# Patient Record
Sex: MALE | Race: BLACK OR AFRICAN AMERICAN | NOT HISPANIC OR LATINO | Employment: OTHER | URBAN - METROPOLITAN AREA
[De-identification: names, ages, dates, MRNs, and addresses within clinical notes are randomized per-mention and may not be internally consistent; named-entity substitution may affect disease eponyms.]

---

## 2017-02-16 ENCOUNTER — TELEPHONE (OUTPATIENT)
Dept: TRANSPLANT | Facility: CLINIC | Age: 67
End: 2017-02-16

## 2017-02-24 ENCOUNTER — TELEPHONE (OUTPATIENT)
Dept: TRANSPLANT | Facility: CLINIC | Age: 67
End: 2017-02-24

## 2017-02-24 NOTE — TELEPHONE ENCOUNTER
----- Message from Shadi Burgos sent at 2/24/2017  8:13 AM CST -----  Contact: pt  Fax # [phone] Dr Lobito Moss for labs

## 2017-03-21 ENCOUNTER — TELEPHONE (OUTPATIENT)
Dept: TRANSPLANT | Facility: CLINIC | Age: 67
End: 2017-03-21

## 2017-03-21 NOTE — TELEPHONE ENCOUNTER
----- Message from Maria De Jesus Hyatt sent at 3/14/2017 10:19 AM CDT -----  On 3/14/17, I left a message for pt to sign [employer] form so 's office can send lab results to us.

## 2017-03-31 ENCOUNTER — TELEPHONE (OUTPATIENT)
Dept: TRANSPLANT | Facility: CLINIC | Age: 67
End: 2017-03-31

## 2017-03-31 NOTE — TELEPHONE ENCOUNTER
No communication received from patient after multiple messages left for pt. Will send another missed lab letter.

## 2017-04-18 ENCOUNTER — TELEPHONE (OUTPATIENT)
Dept: TRANSPLANT | Facility: CLINIC | Age: 67
End: 2017-04-18

## 2017-04-18 NOTE — TELEPHONE ENCOUNTER
No communication or labs received from pt after multiple missed lab letters. Will send certified missed lab letter.

## 2017-06-09 ENCOUNTER — TELEPHONE (OUTPATIENT)
Dept: TRANSPLANT | Facility: CLINIC | Age: 67
End: 2017-06-09

## 2017-06-09 NOTE — TELEPHONE ENCOUNTER
No communication received from patient after certifed letter sent. Will change pt's status to lost to follow.